# Patient Record
Sex: MALE | Race: WHITE | NOT HISPANIC OR LATINO | URBAN - METROPOLITAN AREA
[De-identification: names, ages, dates, MRNs, and addresses within clinical notes are randomized per-mention and may not be internally consistent; named-entity substitution may affect disease eponyms.]

---

## 2023-05-12 ENCOUNTER — APPOINTMENT (OUTPATIENT)
Dept: URBAN - METROPOLITAN AREA CLINIC 193 | Age: 28
Setting detail: DERMATOLOGY
End: 2023-05-15

## 2023-05-12 DIAGNOSIS — D485 NEOPLASM OF UNCERTAIN BEHAVIOR OF SKIN: ICD-10-CM

## 2023-05-12 PROBLEM — D48.5 NEOPLASM OF UNCERTAIN BEHAVIOR OF SKIN: Status: ACTIVE | Noted: 2023-05-12

## 2023-05-12 PROCEDURE — 11310 SHAVE SKIN LESION 0.5 CM/<: CPT

## 2023-05-12 PROCEDURE — OTHER SHAVE REMOVAL: OTHER

## 2023-05-12 PROCEDURE — OTHER MIPS QUALITY: OTHER

## 2023-05-12 ASSESSMENT — LOCATION ZONE DERM: LOCATION ZONE: EAR

## 2023-05-12 ASSESSMENT — LOCATION DETAILED DESCRIPTION DERM: LOCATION DETAILED: RIGHT SUPERIOR HELIX

## 2023-05-12 ASSESSMENT — LOCATION SIMPLE DESCRIPTION DERM: LOCATION SIMPLE: RIGHT EAR

## 2023-05-12 NOTE — PROCEDURE: SHAVE REMOVAL
Medical Necessity Information: It is in your best interest to select a reason for this procedure from the list below. All of these items fulfill various CMS LCD requirements except the new and changing color options.
Medical Necessity Clause: This procedure was medically necessary because the lesion that was treated was:
Lab: [id number]
Lab Facility: 0
Detail Level: Detailed
Was A Bandage Applied: Yes
Size Of Lesion In Cm (Required): 0.3
Depth Of Shave: dermis
Biopsy Method: Dermablade
Anesthesia Type: 1% lidocaine with epinephrine
Hemostasis: Drysol
Wound Care: Petrolatum
Render Path Notes In Note?: No
Consent was obtained from the patient. The risks and benefits to therapy were discussed in detail. Specifically, the risks of infection, scarring, bleeding, prolonged wound healing, incomplete removal, allergy to anesthesia, nerve injury and recurrence were addressed. Prior to the procedure, the treatment site was clearly identified and confirmed by the patient. All components of Universal Protocol/PAUSE Rule completed.
Post-Care Instructions: I reviewed with the patient in detail post-care instructions. Patient is to keep the biopsy site dry overnight, and then apply bacitracin twice daily until healed. Patient may apply hydrogen peroxide soaks to remove any crusting.
Notification Instructions: Patient will be notified of pathology results. However, patient instructed to call the office if not contacted within 2 weeks.
Billing Type: Third-Party Bill

## 2024-04-19 ENCOUNTER — OFFICE VISIT (OUTPATIENT)
Dept: NEUROLOGY | Facility: CLINIC | Age: 29
End: 2024-04-19
Payer: COMMERCIAL

## 2024-04-19 VITALS — HEIGHT: 70 IN | BODY MASS INDEX: 24.34 KG/M2 | WEIGHT: 170 LBS

## 2024-04-19 DIAGNOSIS — R41.9 COGNITIVE COMPLAINTS: Primary | ICD-10-CM

## 2024-04-19 PROCEDURE — 99204 OFFICE O/P NEW MOD 45 MIN: CPT | Performed by: STUDENT IN AN ORGANIZED HEALTH CARE EDUCATION/TRAINING PROGRAM

## 2024-04-19 RX ORDER — RISPERIDONE 0.5 MG/1
0.5 TABLET ORAL DAILY
COMMUNITY
Start: 2024-04-16 | End: 2024-05-16

## 2024-04-19 RX ORDER — TRAZODONE HYDROCHLORIDE 150 MG/1
150 TABLET ORAL
COMMUNITY
Start: 2024-04-04 | End: 2024-05-04

## 2024-04-19 RX ORDER — ESCITALOPRAM OXALATE 10 MG/1
10 TABLET ORAL
COMMUNITY
Start: 2024-04-11 | End: 2024-05-11

## 2024-04-19 NOTE — PROGRESS NOTES
Neurology Outpatient Encounter  [facility]    Patient Name: Tommy Hinton   Patient : 1995  Patient MRN: [id number]  Date of service: 24     Summary:   Tommy Hinton is a 29 y.o. male with a past medical history notable for drug abuse (inhalants), psychosis, and anxiety presents for cognitive concerns.  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  He describes lack of energy, lack of motivation, difficulty concentration, and short term memory loss.  He reports he is coming for evaluation of ADD/ADHD and specifically stimulants.  He states he used stimulants in college and they were very helpful.  He states cocaine was also very helpful.    Assessment & Plan:  Pt presents with cognitive complaints likely secondary to nitrous oxide abuse.  There may certainly be a component of ADD/ADHD however that is out of my area of expertise and more importantly would not recommended stimulants while in active recovery.      He has a history of nitrous oxide abuse which can cause B12 deficiency.  This may be leading to some degree of cognitive issues as well.  No evidence of myelopathy on examination.  Will check B12, MMA, and homocysteine.     Examination:   Mental status: Wide awake, alert.  Normal comprehension, attention.    Cranial nerves: Pupils equal, reactive.  Full EOM.  Face symmetric.  Speech clear.    Motor: No focal weakness.  Normal tone.  No abnormal movements.    Sensation: Intact to LT, VBS throughout.    Reflexes: 2+ biceps, triceps, patellar, achilles.  No Babinski, Clancy's, or clonus.     Cerebellar: No dysmetria FNF.  No truncal ataxia.    Orders Placed This Encounter   Procedures    Vitamin B12    Methylmalonic Acid, Serum    Homocysteine, serum     History reviewed. No pertinent past medical history.  Current Outpatient Medications   Medication Sig Dispense Refill    escitalopram (LEXAPRO) 10 mg tablet Take 10 mg by mouth.      risperiDONE (RisperDAL) 0.5 mg tablet  Take 0.5 mg by mouth daily.      traZODone (DESYREL) 150 mg tablet Take 150 mg by mouth.       No current facility-administered medications for this visit.     Abbe Calvillo MD  Neurology